# Patient Record
Sex: FEMALE | Race: WHITE | NOT HISPANIC OR LATINO | Employment: FULL TIME | URBAN - METROPOLITAN AREA
[De-identification: names, ages, dates, MRNs, and addresses within clinical notes are randomized per-mention and may not be internally consistent; named-entity substitution may affect disease eponyms.]

---

## 2023-11-01 ENCOUNTER — APPOINTMENT (OUTPATIENT)
Dept: PRIMARY CARE | Facility: CLINIC | Age: 43
End: 2023-11-01
Payer: COMMERCIAL

## 2024-08-13 ENCOUNTER — APPOINTMENT (OUTPATIENT)
Dept: PRIMARY CARE | Facility: CLINIC | Age: 44
End: 2024-08-13
Payer: COMMERCIAL

## 2024-08-13 VITALS
HEIGHT: 62 IN | HEART RATE: 71 BPM | SYSTOLIC BLOOD PRESSURE: 110 MMHG | BODY MASS INDEX: 53.92 KG/M2 | WEIGHT: 293 LBS | DIASTOLIC BLOOD PRESSURE: 68 MMHG | OXYGEN SATURATION: 98 % | TEMPERATURE: 97.6 F

## 2024-08-13 DIAGNOSIS — Z13.220 LIPID SCREENING: ICD-10-CM

## 2024-08-13 DIAGNOSIS — Z00.00 ENCOUNTER FOR PREVENTIVE HEALTH EXAMINATION: Primary | ICD-10-CM

## 2024-08-13 DIAGNOSIS — Z00.00 HEALTHCARE MAINTENANCE: ICD-10-CM

## 2024-08-13 PROCEDURE — 99396 PREV VISIT EST AGE 40-64: CPT | Performed by: FAMILY MEDICINE

## 2024-08-13 PROCEDURE — 1036F TOBACCO NON-USER: CPT | Performed by: FAMILY MEDICINE

## 2024-08-13 PROCEDURE — 3008F BODY MASS INDEX DOCD: CPT | Performed by: FAMILY MEDICINE

## 2024-08-13 ASSESSMENT — PATIENT HEALTH QUESTIONNAIRE - PHQ9
1. LITTLE INTEREST OR PLEASURE IN DOING THINGS: NOT AT ALL
SUM OF ALL RESPONSES TO PHQ9 QUESTIONS 1 AND 2: 0
2. FEELING DOWN, DEPRESSED OR HOPELESS: NOT AT ALL

## 2024-08-13 NOTE — PROGRESS NOTES
"Subjective   Chief complaint: Lennie Díaz is a 43 y.o. female who presents for Annual Exam (Patient is in office today for a physical ).    HPI:  Here for health maintenance visit.  Last seen 2022.    No recent blood work.  Would like an order for labs.  Medications:  Only on a MVI.    Ophthalmology apts up to date.  Dental apt are up to date.  Sleep:  doing ok.  Gets at least 7 hours.  Diet: discussed healthy guidelines.  Usually drinks water, adjusting diet to low carb.   Exercise:  discussed healthy guidelines  Mood: no concerns    PMH:  No significant PMH  Fatty liver change per chart record    PSH:    D+C     GYN:  Up to date with exams  IUD placed           Objective   /68 (BP Location: Left arm, Patient Position: Sitting, BP Cuff Size: Large adult)   Pulse 71   Temp 36.4 °C (97.6 °F) (Temporal)   Ht 1.575 m (5' 2\")   Wt 149 kg (328 lb)   SpO2 98% Comment: RA  BMI 59.99 kg/m²   Physical Exam  General:  Alert, oriented, no acute distress  Eyes:  Sclerae white, PER, conjunctivae clear  ENT:  No nasal congestion.    Neck: Supple  Endocrine:  No thyromegaly.   Respiratory:  Normal breath sounds.  No wheezing, rhonchi nor crackles.  No dyspnea.  Cardiovascular:  S1 and S2 positive.  Regular rate and rhythm.  No gallops.  No murmurs.  Vascular:  No edema.  Skin warm and dry.  CNS:  No gross neurological deficits.  Gait within normal limits.    Psychiatric:  Affect is positive and appropriate.  No depression.  No anxiety.    Review of Systems   I have reviewed and reconciled the medication list with the patient today. No current outpatient medications on file.     Imaging:  No results found.     Labs reviewed:    Lab Results   Component Value Date    WBC 8.3 10/05/2022    HGB 13.1 10/05/2022    HCT 42.0 10/05/2022     10/05/2022    CHOL 160 10/05/2022    TRIG 139 10/05/2022    HDL 53.0 10/05/2022    ALT 39 10/05/2022    AST 34 10/05/2022     10/05/2022    K 4.0 " 10/05/2022     10/05/2022    CREATININE 0.66 10/05/2022    BUN 10 10/05/2022    CO2 27 10/05/2022    TSH 2.99 10/05/2022    INR 1.1 09/04/2021       Assessment/Plan   Problem List Items Addressed This Visit       BMI 50.0-59.9, adult (Multi)    Relevant Orders    Vitamin D 25-Hydroxy,Total (for eval of Vitamin D levels)    Encounter for preventive health examination - Primary    Relevant Orders    CBC and Auto Differential    TSH with reflex to Free T4 if abnormal     Other Visit Diagnoses       Lipid screening        Relevant Orders    Comprehensive Metabolic Panel    Lipid Panel    Healthcare maintenance        Relevant Orders    HIV 1/2 Antigen/Antibody Screen with Reflex to Confirmation          Follow up pending lab results.

## 2024-08-15 ENCOUNTER — LAB (OUTPATIENT)
Dept: LAB | Facility: LAB | Age: 44
End: 2024-08-15
Payer: COMMERCIAL

## 2024-08-15 DIAGNOSIS — Z00.00 HEALTHCARE MAINTENANCE: ICD-10-CM

## 2024-08-15 DIAGNOSIS — Z00.00 ENCOUNTER FOR PREVENTIVE HEALTH EXAMINATION: ICD-10-CM

## 2024-08-15 DIAGNOSIS — Z13.220 LIPID SCREENING: ICD-10-CM

## 2024-08-15 LAB
25(OH)D3 SERPL-MCNC: 26 NG/ML (ref 30–100)
ALBUMIN SERPL BCP-MCNC: 4.1 G/DL (ref 3.4–5)
ALP SERPL-CCNC: 48 U/L (ref 33–110)
ALT SERPL W P-5'-P-CCNC: 25 U/L (ref 7–45)
ANION GAP SERPL CALC-SCNC: 12 MMOL/L (ref 10–20)
AST SERPL W P-5'-P-CCNC: 25 U/L (ref 9–39)
BASOPHILS # BLD AUTO: 0.04 X10*3/UL (ref 0–0.1)
BASOPHILS NFR BLD AUTO: 0.5 %
BILIRUB SERPL-MCNC: 0.5 MG/DL (ref 0–1.2)
BUN SERPL-MCNC: 10 MG/DL (ref 6–23)
CALCIUM SERPL-MCNC: 9.5 MG/DL (ref 8.6–10.3)
CHLORIDE SERPL-SCNC: 106 MMOL/L (ref 98–107)
CHOLEST SERPL-MCNC: 169 MG/DL (ref 0–199)
CHOLESTEROL/HDL RATIO: 3.4
CO2 SERPL-SCNC: 25 MMOL/L (ref 21–32)
CREAT SERPL-MCNC: 0.74 MG/DL (ref 0.5–1.05)
EGFRCR SERPLBLD CKD-EPI 2021: >90 ML/MIN/1.73M*2
EOSINOPHIL # BLD AUTO: 0.23 X10*3/UL (ref 0–0.7)
EOSINOPHIL NFR BLD AUTO: 3.1 %
ERYTHROCYTE [DISTWIDTH] IN BLOOD BY AUTOMATED COUNT: 13.9 % (ref 11.5–14.5)
GLUCOSE SERPL-MCNC: 87 MG/DL (ref 74–99)
HCT VFR BLD AUTO: 43.8 % (ref 36–46)
HDLC SERPL-MCNC: 49.7 MG/DL
HGB BLD-MCNC: 13.5 G/DL (ref 12–16)
IMM GRANULOCYTES # BLD AUTO: 0.02 X10*3/UL (ref 0–0.7)
IMM GRANULOCYTES NFR BLD AUTO: 0.3 % (ref 0–0.9)
LDLC SERPL CALC-MCNC: 97 MG/DL
LYMPHOCYTES # BLD AUTO: 2.6 X10*3/UL (ref 1.2–4.8)
LYMPHOCYTES NFR BLD AUTO: 35 %
MCH RBC QN AUTO: 26.4 PG (ref 26–34)
MCHC RBC AUTO-ENTMCNC: 30.8 G/DL (ref 32–36)
MCV RBC AUTO: 86 FL (ref 80–100)
MONOCYTES # BLD AUTO: 0.53 X10*3/UL (ref 0.1–1)
MONOCYTES NFR BLD AUTO: 7.1 %
NEUTROPHILS # BLD AUTO: 4.01 X10*3/UL (ref 1.2–7.7)
NEUTROPHILS NFR BLD AUTO: 54 %
NON HDL CHOLESTEROL: 119 MG/DL (ref 0–149)
NRBC BLD-RTO: 0 /100 WBCS (ref 0–0)
PLATELET # BLD AUTO: 454 X10*3/UL (ref 150–450)
POTASSIUM SERPL-SCNC: 4.5 MMOL/L (ref 3.5–5.3)
PROT SERPL-MCNC: 7.4 G/DL (ref 6.4–8.2)
RBC # BLD AUTO: 5.12 X10*6/UL (ref 4–5.2)
SODIUM SERPL-SCNC: 138 MMOL/L (ref 136–145)
TRIGL SERPL-MCNC: 113 MG/DL (ref 0–149)
TSH SERPL-ACNC: 2.59 MIU/L (ref 0.44–3.98)
VLDL: 23 MG/DL (ref 0–40)
WBC # BLD AUTO: 7.4 X10*3/UL (ref 4.4–11.3)

## 2024-08-15 PROCEDURE — 80061 LIPID PANEL: CPT

## 2024-08-15 PROCEDURE — 87389 HIV-1 AG W/HIV-1&-2 AB AG IA: CPT

## 2024-08-15 PROCEDURE — 82306 VITAMIN D 25 HYDROXY: CPT

## 2024-08-15 PROCEDURE — 80053 COMPREHEN METABOLIC PANEL: CPT

## 2024-08-15 PROCEDURE — 84443 ASSAY THYROID STIM HORMONE: CPT

## 2024-08-15 PROCEDURE — 85025 COMPLETE CBC W/AUTO DIFF WBC: CPT

## 2024-08-15 PROCEDURE — 36415 COLL VENOUS BLD VENIPUNCTURE: CPT

## 2024-08-16 LAB — HIV 1+2 AB+HIV1 P24 AG SERPL QL IA: NONREACTIVE

## 2025-02-14 ENCOUNTER — OFFICE VISIT (OUTPATIENT)
Dept: PRIMARY CARE | Facility: CLINIC | Age: 45
End: 2025-02-14
Payer: COMMERCIAL

## 2025-02-14 VITALS
BODY MASS INDEX: 62 KG/M2 | WEIGHT: 293 LBS | OXYGEN SATURATION: 96 % | TEMPERATURE: 97.4 F | DIASTOLIC BLOOD PRESSURE: 102 MMHG | SYSTOLIC BLOOD PRESSURE: 142 MMHG | HEART RATE: 83 BPM

## 2025-02-14 DIAGNOSIS — G43.001 MIGRAINE WITHOUT AURA AND WITH STATUS MIGRAINOSUS, NOT INTRACTABLE: Primary | ICD-10-CM

## 2025-02-14 PROCEDURE — 99213 OFFICE O/P EST LOW 20 MIN: CPT | Performed by: FAMILY MEDICINE

## 2025-02-14 RX ORDER — SUMATRIPTAN SUCCINATE 100 MG/1
100 TABLET ORAL ONCE AS NEEDED
Qty: 12 TABLET | Refills: 3 | Status: SHIPPED | OUTPATIENT
Start: 2025-02-14 | End: 2026-02-14

## 2025-02-14 NOTE — ASSESSMENT & PLAN NOTE
Discussed possible triggers.    Encouraged staying hydrated.  May start Magnesium 250 mg daily OTC.  Discussed the triptan medication, directions for use and possible side effects.  She should let us know if she has any concerns or questions which may arise.   May use OTC ibuprofen, Excedrin Migraine or acetaminophen if desired.    If headache continues to worsen this weekend, present to the emergency department for evaluation.  Patient voiced understanding and is agreeable to the plan.

## 2025-02-14 NOTE — PROGRESS NOTES
Subjective   Chief complaint: Lennie Díaz is a 44 y.o. female who presents for Headache (headache on right side for last 4-5 days, has tried taking advil and tylenol, only works for a couple of hours then it comes back).    HPI:  Here accompanied by her .    She reports that for the last 4-5 days she has had a headache on the right side of her head that is currently rated 6-7/10.  She will sometimes get a stabbing sensation into her eye.  Pain is constant.  No nausea.  Has used tylenol and ibuprofen.  Gets 6-7 hours of sleep usually.  No photophobia reported.  Has never been on a triptan medication.  Has the Mirena in place - not new.  Place 2 years ago.  No menses.  Is not on any prescription medications.   Last labs August 2024.       Objective   BP (!) 142/102 (BP Location: Left arm, Patient Position: Sitting, BP Cuff Size: Large adult)   Pulse 83   Temp 36.3 °C (97.4 °F) (Temporal)   Wt (!) 154 kg (339 lb)   SpO2 96% Comment: RA  BMI 62.00 kg/m²   Physical Exam  General:  Alert, oriented, no acute distress  Eyes:  Sclerae white, PER, conjunctivae clear  ENT:  No nasal congestion.    Neck: Supple  Respiratory:  Normal breath sounds.  No wheezing, rhonchi nor crackles.  No dyspnea.  Cardiovascular:  S1 and S2 positive.  Regular rate and rhythm.  No gallops.  No murmurs.  Vascular:  Skin warm and dry.  CNS:  No gross neurological deficits.  Gait within normal limits.  PER.  Psychiatric:  Affect is appropriate.  No depression.  No anxiety.    Review of Systems   I have reviewed and reconciled the medication list with the patient today.   Current Outpatient Medications:   •  SUMAtriptan (Imitrex) 100 mg tablet, Take 1 tablet (100 mg) by mouth 1 time if needed for migraine. May repeat after 2 hours., Disp: 12 tablet, Rfl: 3     Imaging:  No results found.     Labs reviewed:    Lab Results   Component Value Date    WBC 7.4 08/15/2024    HGB 13.5 08/15/2024    HCT 43.8 08/15/2024     (H) 08/15/2024     CHOL 169 08/15/2024    TRIG 113 08/15/2024    HDL 49.7 08/15/2024    ALT 25 08/15/2024    AST 25 08/15/2024     08/15/2024    K 4.5 08/15/2024     08/15/2024    CREATININE 0.74 08/15/2024    BUN 10 08/15/2024    CO2 25 08/15/2024    TSH 2.59 08/15/2024    INR 1.1 09/04/2021       Assessment/Plan   Problem List Items Addressed This Visit       Migraine without aura and with status migrainosus, not intractable - Primary     Discussed possible triggers.    Encouraged staying hydrated.  May start Magnesium 250 mg daily OTC.  Discussed the triptan medication, directions for use and possible side effects.  She should let us know if she has any concerns or questions which may arise.   May use OTC ibuprofen, Excedrin Migraine or acetaminophen if desired.    If headache continues to worsen this weekend, present to the emergency department for evaluation.  Patient voiced understanding and is agreeable to the plan.           Relevant Medications    SUMAtriptan (Imitrex) 100 mg tablet       Continue other medications as listed  Follow up in 6 months or sooner for wellness visit.

## 2025-05-13 ENCOUNTER — APPOINTMENT (OUTPATIENT)
Dept: PRIMARY CARE | Facility: CLINIC | Age: 45
End: 2025-05-13
Payer: COMMERCIAL

## 2025-05-13 VITALS
DIASTOLIC BLOOD PRESSURE: 110 MMHG | TEMPERATURE: 98.1 F | OXYGEN SATURATION: 98 % | SYSTOLIC BLOOD PRESSURE: 156 MMHG | WEIGHT: 293 LBS | BODY MASS INDEX: 62.3 KG/M2 | HEART RATE: 94 BPM

## 2025-05-13 DIAGNOSIS — G43.001 MIGRAINE WITHOUT AURA AND WITH STATUS MIGRAINOSUS, NOT INTRACTABLE: Primary | ICD-10-CM

## 2025-05-13 DIAGNOSIS — I10 HYPERTENSION, UNSPECIFIED TYPE: ICD-10-CM

## 2025-05-13 PROCEDURE — 3080F DIAST BP >= 90 MM HG: CPT

## 2025-05-13 PROCEDURE — 99213 OFFICE O/P EST LOW 20 MIN: CPT

## 2025-05-13 PROCEDURE — 3077F SYST BP >= 140 MM HG: CPT

## 2025-05-13 PROCEDURE — 1036F TOBACCO NON-USER: CPT

## 2025-05-13 RX ORDER — METOPROLOL SUCCINATE 25 MG/1
25 TABLET, EXTENDED RELEASE ORAL DAILY
Qty: 30 TABLET | Refills: 11 | Status: SHIPPED | OUTPATIENT
Start: 2025-05-13 | End: 2026-05-13

## 2025-05-13 ASSESSMENT — PATIENT HEALTH QUESTIONNAIRE - PHQ9
SUM OF ALL RESPONSES TO PHQ9 QUESTIONS 1 AND 2: 0
2. FEELING DOWN, DEPRESSED OR HOPELESS: NOT AT ALL
1. LITTLE INTEREST OR PLEASURE IN DOING THINGS: NOT AT ALL

## 2025-05-13 NOTE — PROGRESS NOTES
Subjective   Patient ID: Lennie Díaz is a 44 y.o. female who presents for Migraine (Recently for almost a week).    HPI   Female presented today for follow-up.  Patient has history of headaches started couple months ago she was evaluated in February and was started on Imitrex.  Stated that Imitrex was initially helping however she was developing arm heaviness which she could not tolerate.  Last time she used Imitrex did not help with the headaches.  Duration is variable.  No other associated symptoms with the headache.  It is localized on the right side of the head and radiates down the face and jaw.  Has been using over-the-counter ibuprofen with some help  Blood pressure is noted to be high today, blood pressure was elevated during the last appointment.    ROS is otherwise negative    Review of Systems    Objective   BP (!) 156/110 (BP Location: Left arm, Patient Position: Sitting, BP Cuff Size: Large adult)   Pulse 94   Temp 36.7 °C (98.1 °F) (Temporal)   Wt (!) 154 kg (340 lb 9.6 oz)   SpO2 98% Comment: RA  BMI 62.30 kg/m²     Physical Exam  General: Awake, alert/oriented x4, well developed, no acute distress  Head: Atraumatic/Normocephalic  Eyes: Normal external exam, EOMI, PERRLA  ENT: Oropharynx normal. Uvula midline, no tonsillar edema, moist mucous membranes  Cardiovascular: RRR, S1/S2, no murmurs, rubs, or gallops, radial pulses +2, no edema of extremities  Pulmonary: CTAB, no respiratory distress. No wheezes, rales, or ronchi  Abdomen: +BS, soft, non-tender, nondistended, no guarding or rebound, no masses noted  MSK: No joint swelling, normal movements of all extremities. Range of motion- normal.  Extremities: FROM, no edema, wounds, or contusions  Skin- No lesions, contusions, or erythema.  Neuro: Alert/oriented x4, no focal motor or sensory deficits  Psychiatric: Judgment intact. Appropriate mood and behavior     Assessment/Plan   Problem List Items Addressed This Visit          Neuro    Migraine  without aura and with status migrainosus, not intractable - Primary    Relevant Medications    rimegepant (Nurtec ODT) 75 mg tablet,disintegrating    metoprolol succinate XL (Toprol-XL) 25 mg 24 hr tablet    Other Relevant Orders    MR brain wo IV contrast     Other Visit Diagnoses         Hypertension, unspecified type              Patient presented with a new onset headache, detailed neuroexam is negative.  Pupils are symmetric reactive to light.  No focal neurological deficits.  Visual field is intact.  She could not tolerate Imitrex.  Blood pressure is elevated.  Discussed with the patient starting her on metoprolol to help with both blood pressure and headache.  Also prescribed her Nurtec ODT 75 mg every other day.  As her symptoms are relatively new ordered the brain MRI.  Follow-up in 4 to 6 weeks

## 2025-05-28 ENCOUNTER — APPOINTMENT (OUTPATIENT)
Dept: RADIOLOGY | Facility: CLINIC | Age: 45
End: 2025-05-28
Payer: COMMERCIAL

## 2025-05-28 DIAGNOSIS — G43.001 MIGRAINE WITHOUT AURA AND WITH STATUS MIGRAINOSUS, NOT INTRACTABLE: ICD-10-CM

## 2025-05-28 PROCEDURE — 70551 MRI BRAIN STEM W/O DYE: CPT

## 2025-05-28 PROCEDURE — 70551 MRI BRAIN STEM W/O DYE: CPT | Performed by: RADIOLOGY

## 2025-05-29 ENCOUNTER — TELEPHONE (OUTPATIENT)
Dept: PRIMARY CARE | Facility: CLINIC | Age: 45
End: 2025-05-29
Payer: COMMERCIAL

## 2025-05-29 NOTE — TELEPHONE ENCOUNTER
We need to add the side effects that she had to her allergy list along with the medication she tried so that we have documentation.     Without documentation insurance will not approve.  They said she has to try 2 forms of the triptan medications.

## 2025-05-29 NOTE — TELEPHONE ENCOUNTER
Spoke to insurance company and they will not approve the Nurtec until the patient tries and fails 2 other medications like Sumatriptan, Rizatriptan, etc

## 2025-05-29 NOTE — TELEPHONE ENCOUNTER
Started PA for patient for her rx Nurte  Unable to verify eligibility. Message sent to patient to contact insurance company.    Rx Bin [id number]  N [id number]  ID [id number]  Group [id number]

## 2025-05-30 DIAGNOSIS — G43.001 MIGRAINE WITHOUT AURA AND WITH STATUS MIGRAINOSUS, NOT INTRACTABLE: Primary | ICD-10-CM

## 2025-05-30 RX ORDER — RIZATRIPTAN BENZOATE 10 MG/1
10 TABLET ORAL ONCE AS NEEDED
Qty: 9 TABLET | Refills: 0 | Status: SHIPPED | OUTPATIENT
Start: 2025-05-30 | End: 2025-06-29

## 2025-05-30 NOTE — TELEPHONE ENCOUNTER
Please let her know about this insurance policy, I have prescribed Rizatriptan 10 mg as needed for migraine(not to exceed 30 mg in 24 hours.  Also please advise her not to take any left over Imitrex with the new prescription.

## 2025-06-13 DIAGNOSIS — G43.001 MIGRAINE WITHOUT AURA AND WITH STATUS MIGRAINOSUS, NOT INTRACTABLE: Primary | ICD-10-CM

## 2025-06-13 RX ORDER — SUMATRIPTAN SUCCINATE 100 MG/1
100 TABLET ORAL ONCE AS NEEDED
Qty: 9 TABLET | Refills: 5 | Status: SHIPPED | OUTPATIENT
Start: 2025-06-13 | End: 2026-06-13

## 2025-06-19 ENCOUNTER — APPOINTMENT (OUTPATIENT)
Dept: PRIMARY CARE | Facility: CLINIC | Age: 45
End: 2025-06-19
Payer: COMMERCIAL

## 2025-06-19 VITALS
WEIGHT: 293 LBS | BODY MASS INDEX: 62.81 KG/M2 | DIASTOLIC BLOOD PRESSURE: 86 MMHG | HEART RATE: 90 BPM | SYSTOLIC BLOOD PRESSURE: 134 MMHG | TEMPERATURE: 97.5 F | OXYGEN SATURATION: 97 %

## 2025-06-19 DIAGNOSIS — I10 HYPERTENSION, UNSPECIFIED TYPE: Primary | ICD-10-CM

## 2025-06-19 DIAGNOSIS — G43.001 MIGRAINE WITHOUT AURA AND WITH STATUS MIGRAINOSUS, NOT INTRACTABLE: ICD-10-CM

## 2025-06-19 DIAGNOSIS — E55.9 VITAMIN D DEFICIENCY: ICD-10-CM

## 2025-06-19 PROCEDURE — 1036F TOBACCO NON-USER: CPT

## 2025-06-19 PROCEDURE — 99214 OFFICE O/P EST MOD 30 MIN: CPT

## 2025-06-19 PROCEDURE — 3075F SYST BP GE 130 - 139MM HG: CPT

## 2025-06-19 PROCEDURE — 3079F DIAST BP 80-89 MM HG: CPT

## 2025-06-19 ASSESSMENT — PATIENT HEALTH QUESTIONNAIRE - PHQ9
2. FEELING DOWN, DEPRESSED OR HOPELESS: NOT AT ALL
1. LITTLE INTEREST OR PLEASURE IN DOING THINGS: NOT AT ALL
SUM OF ALL RESPONSES TO PHQ9 QUESTIONS 1 AND 2: 0

## 2025-06-19 NOTE — PROGRESS NOTES
Subjective   Patient ID: Lennie Díaz is a 44 y.o. female who presents for Follow-up (Patient is here today for a 4-6 week follow up).    HPI   44-year-old female presented today for follow-up.  Has history of migraine and hypertension.  She was started on metoprolol 25 mg daily.  Patient has tried Imitrex and rizatriptan without any help.  Patient takes too much ibuprofen to help with acute headaches.  She gets headaches every day.  Brain MRI was done and was consistent with migraine, no other pathologies.  Otherwise she has no symptoms at today's visit.  She was started on metoprolol 25 mg daily which has helped blood pressure.  Blood pressure today 134/86    Review of Systems  As above  Objective   /86 (BP Location: Right arm, Patient Position: Sitting, BP Cuff Size: Large adult)   Pulse 90   Temp 36.4 °C (97.5 °F) (Temporal)   Wt (!) 156 kg (343 lb 6.4 oz)   SpO2 97% Comment: RA  BMI 62.81 kg/m²     Physical Exam   Unchanged from last visit    Assessment/Plan   Problem List Items Addressed This Visit          Neuro    Migraine without aura and with status migrainosus, not intractable    Relevant Medications    rimegepant (Nurtec ODT) 75 mg tablet,disintegrating     Other Visit Diagnoses         Hypertension, unspecified type    -  Primary    Continue metoprolol 25 mg daily      Vitamin D deficiency        Continue over-the-counter vitamin D supplement

## 2025-07-14 ENCOUNTER — PATIENT MESSAGE (OUTPATIENT)
Dept: PRIMARY CARE | Facility: CLINIC | Age: 45
End: 2025-07-14
Payer: COMMERCIAL

## 2025-08-19 ENCOUNTER — TELEPHONE (OUTPATIENT)
Dept: PRIMARY CARE | Facility: CLINIC | Age: 45
End: 2025-08-19
Payer: COMMERCIAL

## 2025-09-04 ENCOUNTER — APPOINTMENT (OUTPATIENT)
Dept: PRIMARY CARE | Facility: CLINIC | Age: 45
End: 2025-09-04
Payer: COMMERCIAL

## 2025-09-04 ASSESSMENT — PATIENT HEALTH QUESTIONNAIRE - PHQ9
1. LITTLE INTEREST OR PLEASURE IN DOING THINGS: NOT AT ALL
2. FEELING DOWN, DEPRESSED OR HOPELESS: NOT AT ALL
SUM OF ALL RESPONSES TO PHQ9 QUESTIONS 1 AND 2: 0

## 2025-09-17 ENCOUNTER — APPOINTMENT (OUTPATIENT)
Dept: PRIMARY CARE | Facility: CLINIC | Age: 45
End: 2025-09-17
Payer: COMMERCIAL

## 2025-09-24 ENCOUNTER — APPOINTMENT (OUTPATIENT)
Dept: PRIMARY CARE | Facility: CLINIC | Age: 45
End: 2025-09-24
Payer: COMMERCIAL